# Patient Record
Sex: FEMALE | Race: WHITE | Employment: FULL TIME | URBAN - METROPOLITAN AREA
[De-identification: names, ages, dates, MRNs, and addresses within clinical notes are randomized per-mention and may not be internally consistent; named-entity substitution may affect disease eponyms.]

---

## 2019-05-22 ENCOUNTER — COMMUNICATION - HEALTHEAST (OUTPATIENT)
Dept: FAMILY MEDICINE | Facility: CLINIC | Age: 29
End: 2019-05-22

## 2019-05-22 ENCOUNTER — OFFICE VISIT - HEALTHEAST (OUTPATIENT)
Dept: FAMILY MEDICINE | Facility: CLINIC | Age: 29
End: 2019-05-22

## 2019-05-22 DIAGNOSIS — R63.1 POLYDIPSIA: ICD-10-CM

## 2019-05-22 DIAGNOSIS — R10.9 LEFT FLANK PAIN: ICD-10-CM

## 2019-05-22 DIAGNOSIS — R35.89 POLYURIA: ICD-10-CM

## 2019-05-22 LAB
ALBUMIN UR-MCNC: NEGATIVE MG/DL
APPEARANCE UR: CLEAR
BACTERIA #/AREA URNS HPF: ABNORMAL HPF
BILIRUB UR QL STRIP: NEGATIVE
COLOR UR AUTO: YELLOW
FASTING STATUS PATIENT QL REPORTED: YES
GLUCOSE BLD-MCNC: 82 MG/DL (ref 70–99)
GLUCOSE UR STRIP-MCNC: NEGATIVE MG/DL
HCG UR QL: NEGATIVE
HGB UR QL STRIP: ABNORMAL
KETONES UR STRIP-MCNC: NEGATIVE MG/DL
LEUKOCYTE ESTERASE UR QL STRIP: NEGATIVE
NITRATE UR QL: NEGATIVE
PH UR STRIP: 6 [PH] (ref 5–8)
RBC #/AREA URNS AUTO: ABNORMAL HPF
SP GR UR STRIP: 1.01 (ref 1–1.03)
SQUAMOUS #/AREA URNS AUTO: ABNORMAL LPF
UROBILINOGEN UR STRIP-ACNC: ABNORMAL
WBC #/AREA URNS AUTO: ABNORMAL HPF

## 2019-05-22 RX ORDER — CYCLOBENZAPRINE HCL 10 MG
10 TABLET ORAL
Qty: 30 TABLET | Refills: 0 | Status: SHIPPED | OUTPATIENT
Start: 2019-05-22

## 2019-11-18 ENCOUNTER — OFFICE VISIT - HEALTHEAST (OUTPATIENT)
Dept: FAMILY MEDICINE | Facility: CLINIC | Age: 29
End: 2019-11-18

## 2019-11-18 DIAGNOSIS — R07.0 THROAT PAIN: ICD-10-CM

## 2019-11-18 DIAGNOSIS — Z75.8 LANGUAGE BARRIER: ICD-10-CM

## 2019-11-18 DIAGNOSIS — Z60.3 LANGUAGE BARRIER: ICD-10-CM

## 2019-11-18 DIAGNOSIS — J02.0 STREPTOCOCCAL PHARYNGITIS: ICD-10-CM

## 2019-11-18 LAB — DEPRECATED S PYO AG THROAT QL EIA: ABNORMAL

## 2019-11-18 RX ORDER — ACETAMINOPHEN 500 MG
1000 TABLET ORAL EVERY 6 HOURS PRN
Qty: 40 TABLET | Refills: 0 | Status: SHIPPED | OUTPATIENT
Start: 2019-11-18

## 2019-11-18 RX ORDER — LEVONORGESTREL AND ETHINYL ESTRADIOL 0.1-0.02MG
1 KIT ORAL DAILY
Refills: 0 | Status: SHIPPED | COMMUNITY
Start: 2019-09-14

## 2019-11-18 SDOH — SOCIAL STABILITY - SOCIAL INSECURITY: ACCULTURATION DIFFICULTY: Z60.3

## 2021-05-29 NOTE — PROGRESS NOTES
Walk In Care Note                                                                                 Date of Visit: 5/22/2019     Chief Complaint   Deborah Patton is a(n) 28 y.o. White or  female who presents to [facility] with the following complaint(s):  Back Pain (left sided back pain x 1 plus weeks pain radiates around to rib area Denies injury states is always thirsty )       Assessment and Plan   1. Left flank pain  - Urinalysis-UC if Indicated  - Pregnancy, Urine  - cyclobenzaprine (FLEXERIL) 10 MG tablet; Take 1 tablet (10 mg total) by mouth at bedtime as needed for muscle spasms.  Dispense: 30 tablet; Refill: 0    2. Polydipsia  - Glucose    3. Polyuria  - Glucose      Urinalysis completed to evaluate for UTI and hematuria that could suggest nephrolithiasis. Urinalysis is normal / negative. Pregnancy test also negative. Suspect muscular cause of left flank pain. Discussed symptomatic / supportive cares with acetaminophen, ibuprofen, heat, and stretching. Also prescribed cyclobenzaprine as listed above.     Fasting glucose drawn to screen for prediabetes / diabetes due to polydipsia and polyuria in the setting of previous gestational diabetes.     Counseled patient regarding assessment and plan for evaluation and treatment. Questions were answered. See AVS for the specific written instructions and educational handout(s) regarding flank pain and back spasm that were provided at the conclusion of the visit.     Discussed signs / symptoms that warrant urgent / emergent medical attention.     Follow up as needed.      History of Present Illness   Primary symptom: Back pain  Onset: 1+ week ago  Progression: Persisting, varies in intensity  Location: Mid back  Laterality: Left  Quality: Stabbing  Pain rating (0-10): 5  Frequency: Intermittent, occurs daily  Relieving factors: None  Exacerbating factors: No  Radicular pain: Around the side of the chest / abdomen. Has radicular pain down the left buttock  and posterior thigh at times.   Lower extremity weakness: No  Lower extremity paresthesias: No  Saddle anesthesia: No  Bowel incontinence: No  Bladder incontinence: No  Additional symptoms: Dry mouth. Reports polydipsia and polyuria. Reports history of gestational diabetes. Denies weight loss. Denies fevers / chills. Is not breastfeeding at this time. Is receiving Depo Provera for birth control; last injection was at the end of January. Had spotting for 3 days last week. Is sexually active.   Known injury: No  History of similar pain: No  History of spine surgery: No  Home treatments utilized: Acetaminophen and ibuprofen. Not applying ice or heat.   Tobacco user / exposure: No     Review of Systems   Review of Systems   All other systems reviewed and are negative.       Physical Exam   Vitals:    05/22/19 0908   BP: 130/77   Patient Site: Right Arm   Patient Position: Sitting   Cuff Size: Adult Regular   Pulse: 67   Resp: 16   Temp: 98.2  F (36.8  C)   TempSrc: Oral   SpO2: 99%   Weight: 177 lb 6.4 oz (80.5 kg)     Physical Exam   Constitutional: She is oriented to person, place, and time. She appears well-developed and well-nourished.  Non-toxic appearance. She does not appear ill. No distress.   HENT:   Mouth/Throat: Oropharynx is clear and moist and mucous membranes are normal.   Eyes: Conjunctivae and lids are normal. No scleral icterus.   Cardiovascular: Normal rate, regular rhythm, S1 normal and S2 normal. Exam reveals no gallop and no friction rub.   No murmur heard.  Pulmonary/Chest: Effort normal and breath sounds normal. No stridor. She has no wheezes. She has no rhonchi. She has no rales. She exhibits no tenderness, no crepitus and no deformity.   Abdominal: Soft. Bowel sounds are normal. She exhibits no distension and no mass. There is no hepatosplenomegaly. There is no tenderness. There is no CVA tenderness. Hernia confirmed negative in the ventral area.   Musculoskeletal:        Cervical back: She  exhibits no tenderness, no bony tenderness, no deformity and no spasm.        Thoracic back: She exhibits tenderness (right paraspinal muscles). She exhibits no bony tenderness, no deformity and no spasm.        Lumbar back: She exhibits no tenderness, no bony tenderness, no deformity and no spasm.   Strength with knee flexion / extension and ankle plantarflexion / dorsiflexion 5/5 and symmetric.    Neurological: She is alert and oriented to person, place, and time. She has normal strength. No cranial nerve deficit or sensory deficit.   Reflex Scores:       Patellar reflexes are 2+ on the right side and 2+ on the left side.       Achilles reflexes are 1+ on the right side and 1+ on the left side.  Straight leg raise negative bilaterally.    Skin: Skin is warm and dry. No rash noted. She is not diaphoretic. No pallor.   Nursing note and vitals reviewed.       Diagnostic Studies   Laboratory:  Results for orders placed or performed in visit on 05/22/19   Urinalysis-UC if Indicated   Result Value Ref Range    Color, UA Yellow Colorless, Yellow, Straw, Light Yellow    Clarity, UA Clear Clear    Glucose, UA Negative Negative    Bilirubin, UA Negative Negative    Ketones, UA Negative Negative    Specific Gravity, UA 1.010 1.005 - 1.030    Blood, UA Trace (!) Negative    pH, UA 6.0 5.0 - 8.0    Protein, UA Negative Negative mg/dL    Urobilinogen, UA 0.2 E.U./dL 0.2 E.U./dL, 1.0 E.U./dL    Nitrite, UA Negative Negative    Leukocytes, UA Negative Negative    Bacteria, UA None Seen None Seen hpf    RBC, UA 0-2 None Seen, 0-2 hpf    WBC, UA None Seen None Seen, 0-5 hpf    Squam Epithel, UA 0-5 None Seen, 0-5 lpf   Pregnancy, Urine   Result Value Ref Range    Pregnancy Test, Urine Negative Negative     Radiology:  N/A  Electrocardiogram:  N/A     Procedure Note   N/A     Pertinent History   The following portions of the patient's history were reviewed and updated as appropriate: allergies, current medications, past family  history, past medical history, past social history, past surgical history and problem list.     Sea Adams MD  [facility]

## 2021-05-29 NOTE — TELEPHONE ENCOUNTER
----- Message from Sea Adams MD sent at 5/22/2019 10:44 AM CDT -----  Support staff to please contact patient through a [place] phone interpretor and relay the following:  - Your fasting glucose level is normal, which rules out pre-diabetes and type 2 diabetes mellitus.   Sea Adams MD 05/22/19 10:44 AM

## 2021-06-03 VITALS — WEIGHT: 177.4 LBS

## 2021-06-04 VITALS
WEIGHT: 174.13 LBS | TEMPERATURE: 98.3 F | RESPIRATION RATE: 16 BRPM | DIASTOLIC BLOOD PRESSURE: 82 MMHG | OXYGEN SATURATION: 100 % | SYSTOLIC BLOOD PRESSURE: 126 MMHG | HEART RATE: 63 BPM

## 2021-06-16 PROBLEM — E04.1 BENIGN THYROID CYST: Status: ACTIVE | Noted: 2017-03-03

## 2021-06-17 NOTE — PATIENT INSTRUCTIONS - HE
Patient Instructions by Laura Gonzalez MD at 11/18/2019  4:00 PM     Author: Laura Gonzalez MD Service: -- Author Type: Physician    Filed: 11/18/2019  5:57 PM Encounter Date: 11/18/2019 Status: Addendum    : Laura Gonzalez MD (Physician)    Related Notes: Original Note by Laura Gonzalez MD (Physician) filed at 11/18/2019  5:57 PM       1. Keep well hydrated  2. May alternate Tylenol every 6 hours with ibuprofen every 6 hours as needed for pain or fever  3. After 48 hours of antibiotics, start using a new toothbrush  4. If follow up is needed, try and be seen at your primary clinic, or you can be seen by any primary care provider at one of our other [facility] sites  5. If you have any questions, call the clinic number - answered 24/7         Patient Education     Faringitis Por Estreptococos [Pharyngitis, Strep - Confirmed]    Farr prueba mustapha positiva a la infección por estreptococos. Se trata de polly enfermedad contagiosa. La puede contagiar al toser, al besar o al tocar a otras personas después de haberse tocado la boca o la nariz. Los síntomas incluyen dolor de garganta que empeora al tragar, dolor en todo el cuerpo, dolor de jamal y fiebre. [place] tratamiento, le darán un antibiótico con el que debería empezar a sentirse mejor dentro de 1 ó 2 días.  Cuidados En [place]:    Descanse en farr casa y michael abundante cantidad de líquidos para evitar la deshidratación.    Deberá ausentarse del trabajo o la escuela los dos primeros días del tratamiento con antibióticos. Después de eso, la enfermedad ya no es contagiosa y, si se siente mejor, puede regresar a la escuela o el trabajo.    [place] los antibióticos cristian los 10 días completos, incluso aunque se sienta ale después de los primeros floyd de tratamiento. [place] es muy importante para evitar las afecciones del corazón o los riñones que pueden surgir belkis complicación de polly infección de garganta por estreptococo no curada.    Niños: Use acetaminofén  (Tylenol) para aliviar la fiebre, el nerviosismo y el malestar. En niños mayores de seis meses puede usar ibuprofeno (belkis Motrin infantil) en vez de Tylenol.  [NOTA: Si el magdi tiene polly enfermedad hepática o renal crónica, o ha tenido alguna vez polly úlcera estomacal o sangrado gastrointestinal, consulte con farr médico antes de darle estos medicamentos.] (La aspirina no debe usarse nunca en personas menores de 18 años enfermas con fiebre, ya que puede causar daños graves al hígado.)Adultos: Puede usar acetaminofén (Tylenol) o ibuprofeno (Motrin o Advil) para controlar la fiebre o el dolor, a menos que le hayan recetado otro medicamento. [NOTA: Si tiene polly enfermedad hepática o renal crónica, o ha tenido alguna vez polly úlcera estomacal o sangrado gastrointestinal, consulte con farr médico antes de holger estos medicamentos.]    Hay comprimidos o sprays para la garganta (Chloraseptic y otros) que ayudarán a reducir el dolor. También puede hacer gárgaras con agua tibia con sal para aliviar el dolor de garganta. Disuelva   cucharadita de sal en 1 vaso de agua tibia. Resulta especialmente útil hacerlo antes de las comidas.  Programe polly VISITA DE CONTROL con farr médico, o según le indique nuestro personal médico, si no empieza a sentirse mejor cristian la semana próxima.  Busque Prontamente Atención Médica  si algo de lo siguiente ocurre:    Fiebre de 100.4 F (38 C) o más anna, tomada oralmente, que no mejora con los medicamentos.    Dolor nuevo o que va empeorando en los oídos, los senos paranasales o la jamal.    Bultos dolorosos en la parte de atrás del payton.    No puede tragar líquidos ni abrir ale la boca debido al dolor de garganta.    Tiene problemas para respirar o hace ruidos al respirar.    Disfonía (cambios en la voz).    Polly nueva erupción cutánea (salpullido).  Date Last Reviewed: 4/13/2015 2000-2017 [employer]. [address] [place] [address], [place] [address]. Todos los derechos reservados. Esta  información no pretende sustituir la atención médica profesional. Sólo farr médico puede diagnosticar y tratar un problema de hardy.

## 2021-06-28 NOTE — PROGRESS NOTES
Progress Notes by Laura Gonzalez MD at 11/18/2019  4:00 PM     Author: Laura Gonzalez MD Service: -- Author Type: Physician    Filed: 11/18/2019  6:04 PM Encounter Date: 11/18/2019 Status: Signed    : Laura Gonzalez MD (Physician)         Subjective:   Deborah Patton is a 29 y.o. female  Roomed by: Gomez K VIC    Accompanied by Spouse    Refills needed? No    Do you have any forms that need to be filled out? No     services provided by: Family/Friend spouse   /Agency Name Other    Location of  Services: In person      Chief Complaint   Patient presents with   ? Mouth Lesions     x1wk, on back of tongue on L side, L ear pain   Says that 2 weeks ago had a fever, coughing, body pain and sore throat. Says coughing stopped a week ago. Then started having some ear and tongue pain. Admits some of shortness of breath n the last week, but denies CP or headache. Admits painful swallowing now. Says their 3 children, 9, 6 and 2 year old have not been ill.   Review of Systems  See HPI for ROS, otherwise balance of other systems negative    Allergies   Allergen Reactions   ? Penicillins Rash       Current Outpatient Medications:   ?  LESSINA 0.1-20 mg-mcg per tablet, Take 1 tablet by mouth daily., Disp: , Rfl: 0  ?  cyclobenzaprine (FLEXERIL) 10 MG tablet, Take 1 tablet (10 mg total) by mouth at bedtime as needed for muscle spasms., Disp: 30 tablet, Rfl: 0  ?  medroxyprogesterone acetate (DEPO-PROVERA IM), Inject 150 mg into the shoulder, thigh, or buttocks every 3 (three) months., Disp: , Rfl:   Patient Active Problem List   Diagnosis   ? Benign thyroid cyst   ? Family history of Allen syndrome   ? Language barrier     Past Medical History:   Diagnosis Date   ? Benign thyroid cyst 3/3/2017    [place] with no need for follow up at this time   ? Diet controlled gestational diabetes mellitus (GDM), antepartum 8/1/2017   ? Gestational diabetes    ? Helicobacter pylori infection 09/2018     - if none on file, see Problem List    Objective:     Vitals:    11/18/19 1731   BP: 126/82   Patient Site: Right Arm   Patient Position: Sitting   Cuff Size: Adult Regular   Pulse: 63   Resp: 16   Temp: 98.3  F (36.8  C)   TempSrc: Oral   SpO2: 100%   Weight: 174 lb 2 oz (79 kg)   Gen - Pt in NAD  Nose -  non congested, no nasal drainage  Pharynx - non injected, tonsils 1+ size  Neck - no cervical adenopathy    Results for orders placed or performed in visit on 11/18/19   Rapid Strep A Screen-Throat   Result Value Ref Range    Rapid Strep A Antigen Group A Strep detected (!) No Group A Strep detected, presumptive negative   Lab result discussed on day of visit.     Assessment - Plan       1. Streptococcal pharyngitis  - azithromycin (ZITHROMAX) 250 MG tablet; Take 2 tablets (500 mg total) by mouth daily for 5 days.  Dispense: 10 tablet; Refill: 0    2. Throat pain  - Rapid Strep A Screen-Throat  - ibuprofen (ADVIL,MOTRIN) 200 MG tablet; Take 3 tablets (600 mg total) by mouth every 6 (six) hours as needed for pain.  Dispense: 40 tablet; Refill: 0  - acetaminophen (TYLENOL) 500 MG tablet; Take 2 tablets (1,000 mg total) by mouth every 6 (six) hours as needed for pain.  Dispense: 40 tablet; Refill: 0    3. Language barrier    At the conclusion of the encounter, assessment and plan were discussed.   All questions were answered.   The patient or guardian acknowledged understanding and was involved in the decision making regarding the overall care plan.    Patient Instructions   1. Keep well hydrated  2. May alternate Tylenol every 6 hours with ibuprofen every 6 hours as needed for pain or fever  3. After 48 hours of antibiotics, start using a new toothbrush  4. If follow up is needed, try and be seen at your primary clinic, or you can be seen by any primary care provider at one of our other [facility] sites  5. If you have any questions, call the clinic number - answered 24/7         Patient Education     Finesse Por  Estreptococos [Pharyngitis, Strep - Confirmed]    Farr prueba mustapha positiva a la infección por estreptococos. Se trata de polly enfermedad contagiosa. La puede contagiar al toser, al besar o al tocar a otras personas después de haberse tocado la boca o la nariz. Los síntomas incluyen dolor de garganta que empeora al tragar, dolor en todo el cuerpo, dolor de jamal y fiebre. [place] tratamiento, le darán un antibiótico con el que debería empezar a sentirse mejor dentro de 1 ó 2 días.  Cuidados En [place]:    Descanse en farr casa y michael abundante cantidad de líquidos para evitar la deshidratación.    Deberá ausentarse del trabajo o la escuela los dos primeros días del tratamiento con antibióticos. Después de eso, la enfermedad ya no es contagiosa y, si se siente mejor, puede regresar a la escuela o el trabajo.    [place] los antibióticos cristian los 10 días completos, incluso aunque se sienta ale después de los primeros floyd de tratamiento. [place] es muy importante para evitar las afecciones del corazón o los riñones que pueden surgir liu complicación de polly infección de garganta por estreptococo no curada.    Niños: Use acetaminofén (Tylenol) para aliviar la fiebre, el nerviosismo y el malestar. En niños mayores de seis meses puede usar ibuprofeno (liu Motrin infantil) en vez de Tylenol.  [NOTA: Si el magdi tiene polly enfermedad hepática o renal crónica, o ha tenido alguna vez polly úlcera estomacal o sangrado gastrointestinal, consulte con farr médico antes de darle estos medicamentos.] (La aspirina no debe usarse nunca en personas menores de 18 años enfermas con fiebre, ya que puede causar daños graves al hígado.)Adultos: Puede usar acetaminofén (Tylenol) o ibuprofeno (Motrin o Advil) para controlar la fiebre o el dolor, a menos que le hayan recetado otro medicamento. [NOTA: Si tiene polly enfermedad hepática o renal crónica, o ha tenido alguna vez polly úlcera estomacal o sangrado gastrointestinal, consulte con farr médico antes de  holger estos medicamentos.]    Hay comprimidos o sprays para la garganta (Chloraseptic y otros) que ayudarán a reducir el dolor. También puede hacer gárgaras con agua tibia con sal para aliviar el dolor de garganta. Disuelva   cucharadita de sal en 1 vaso de agua tibia. Resulta especialmente útil hacerlo antes de las comidas.  Programe polly VISITA DE CONTROL con farr médico, o según le indique nuestro personal médico, si no empieza a sentirse mejor cristian la semana próxima.  [place] Prontamente Atención Médica  si algo de lo siguiente ocurre:    Fiebre de 100.4 F (38 C) o más anna, tomada oralmente, que no mejora con los medicamentos.    Dolor nuevo o que va empeorando en los oídos, los senos paranasales o la jamal.    Bultos dolorosos en la parte de atrás del payton.    No puede tragar líquidos ni abrir ale la boca debido al dolor de garganta.    Tiene problemas para respirar o hace ruidos al respirar.    Disfonía (cambios en la voz).    Polly nueva erupción cutánea (salpullido).  Date Last Reviewed: 4/13/2015 2000-2017 [employer]. [address] [place] [id number]. Todos los derechos reservados. Esta información no pretende sustituir la atención médica profesional. Sólo farr médico puede diagnosticar y tratar un problema de hardy.

## 2021-07-14 PROBLEM — O24.410 DIET CONTROLLED GESTATIONAL DIABETES MELLITUS (GDM), ANTEPARTUM: Status: RESOLVED | Noted: 2017-08-01 | Resolved: 2019-05-22
